# Patient Record
Sex: MALE | Race: WHITE | NOT HISPANIC OR LATINO | Employment: OTHER | URBAN - METROPOLITAN AREA
[De-identification: names, ages, dates, MRNs, and addresses within clinical notes are randomized per-mention and may not be internally consistent; named-entity substitution may affect disease eponyms.]

---

## 2021-09-13 ENCOUNTER — OFFICE VISIT (OUTPATIENT)
Dept: URGENT CARE | Facility: CLINIC | Age: 81
End: 2021-09-13
Payer: COMMERCIAL

## 2021-09-13 ENCOUNTER — HOSPITAL ENCOUNTER (INPATIENT)
Facility: HOSPITAL | Age: 81
LOS: 2 days | Discharge: HOME | End: 2021-09-15
Attending: FAMILY MEDICINE | Admitting: FAMILY MEDICINE
Payer: MEDICARE

## 2021-09-13 ENCOUNTER — HOSPITAL ENCOUNTER (OUTPATIENT)
Dept: RADIOLOGY | Facility: HOSPITAL | Age: 81
Discharge: HOME | End: 2021-09-13
Payer: COMMERCIAL

## 2021-09-13 VITALS
SYSTOLIC BLOOD PRESSURE: 148 MMHG | DIASTOLIC BLOOD PRESSURE: 71 MMHG | TEMPERATURE: 101.3 F | HEART RATE: 104 BPM | OXYGEN SATURATION: 91 %

## 2021-09-13 DIAGNOSIS — R05.9 COUGH: Primary | ICD-10-CM

## 2021-09-13 DIAGNOSIS — U07.1 PNEUMONIA DUE TO COVID-19 VIRUS: Primary | ICD-10-CM

## 2021-09-13 DIAGNOSIS — R05.9 COUGH: ICD-10-CM

## 2021-09-13 DIAGNOSIS — J12.82 PNEUMONIA DUE TO COVID-19 VIRUS: Primary | ICD-10-CM

## 2021-09-13 LAB
ALBUMIN SERPL-MCNC: 3.8 G/DL (ref 3.5–5.3)
ALP SERPL-CCNC: 68 U/L (ref 45–115)
ALT SERPL-CCNC: 30 U/L (ref 7–52)
ANION GAP SERPL CALC-SCNC: 11 MMOL/L (ref 3–11)
AST SERPL-CCNC: 27 U/L
BASOPHILS # BLD AUTO: 0 10*3/UL
BASOPHILS NFR BLD AUTO: 0 % (ref 0–2)
BILIRUB SERPL-MCNC: 1.84 MG/DL (ref 0.2–1.4)
BUN SERPL-MCNC: 19 MG/DL (ref 7–25)
CALCIUM ALBUM COR SERPL-MCNC: 9.1 MG/DL (ref 8.6–10.3)
CALCIUM SERPL-MCNC: 8.9 MG/DL (ref 8.6–10.3)
CHLORIDE SERPL-SCNC: 101 MMOL/L (ref 98–107)
CO2 SERPL-SCNC: 21 MMOL/L (ref 21–32)
CREAT SERPL-MCNC: 0.91 MG/DL (ref 0.7–1.3)
CRP SERPL-MCNC: 183.5 MG/L
EOSINOPHIL # BLD AUTO: 0 10*3/UL
EOSINOPHIL NFR BLD AUTO: 0 % (ref 0–3)
ERYTHROCYTE [DISTWIDTH] IN BLOOD BY AUTOMATED COUNT: 13.2 % (ref 11.5–15)
ERYTHROCYTE [SEDIMENTATION RATE] IN BLOOD: 69 MM/HR
FLUAV RNA NPH QL NAA+NON-PROBE: NEGATIVE
FLUBV RNA NPH QL NAA+NON-PROBE: NEGATIVE
GFR SERPL CREATININE-BSD FRML MDRD: 79 ML/MIN/1.73M*2
GLUCOSE SERPL-MCNC: 118 MG/DL (ref 70–105)
HCT VFR BLD AUTO: 39.3 % (ref 38–50)
HGB BLD-MCNC: 13.7 G/DL (ref 13.2–17.2)
LYMPHOCYTES # BLD AUTO: 0.6 10*3/UL
LYMPHOCYTES NFR BLD AUTO: 6 % (ref 15–47)
MCH RBC QN AUTO: 33.1 PG (ref 29–34)
MCHC RBC AUTO-ENTMCNC: 34.8 G/DL (ref 32–36)
MCV RBC AUTO: 95 FL (ref 82–97)
MONOCYTES # BLD AUTO: 0.6 10*3/UL
MONOCYTES NFR BLD AUTO: 5 % (ref 5–13)
NEUTROPHILS # BLD AUTO: 10.4 10*3/UL
NEUTROPHILS NFR BLD AUTO: 89 % (ref 46–70)
PLATELET # BLD AUTO: 194 10*3/UL (ref 130–350)
PMV BLD AUTO: 8.9 FL (ref 6.9–10.8)
POTASSIUM SERPL-SCNC: 3.3 MMOL/L (ref 3.5–5.1)
PROT SERPL-MCNC: 7 G/DL (ref 6–8.3)
RBC # BLD AUTO: 4.13 10*6/ΜL (ref 4.1–5.8)
RSV RNA NPH QL NAA+NON-PROBE: NEGATIVE
SARS-COV-2 RDRP RESP QL NAA+PROBE: POSITIVE
SARS-COV-2 RNA RESP QL NAA+PROBE: POSITIVE
SODIUM SERPL-SCNC: 133 MMOL/L (ref 135–145)
WBC # BLD AUTO: 11.7 10*3/UL (ref 3.7–9.6)

## 2021-09-13 PROCEDURE — 6370000100 HC RX 637 (ALT 250 FOR IP): Performed by: FAMILY MEDICINE

## 2021-09-13 PROCEDURE — 80053 COMPREHEN METABOLIC PANEL: CPT | Performed by: FAMILY MEDICINE

## 2021-09-13 PROCEDURE — 86140 C-REACTIVE PROTEIN: CPT | Performed by: FAMILY MEDICINE

## 2021-09-13 PROCEDURE — 6360000200 HC RX 636 W HCPCS (ALT 250 FOR IP): Performed by: FAMILY MEDICINE

## 2021-09-13 PROCEDURE — 85652 RBC SED RATE AUTOMATED: CPT | Performed by: FAMILY MEDICINE

## 2021-09-13 PROCEDURE — 87637 SARSCOV2&INF A&B&RSV AMP PRB: CPT | Performed by: FAMILY MEDICINE

## 2021-09-13 PROCEDURE — 71046 X-RAY EXAM CHEST 2 VIEWS: CPT

## 2021-09-13 PROCEDURE — 93005 ELECTROCARDIOGRAM TRACING: CPT | Performed by: FAMILY MEDICINE

## 2021-09-13 PROCEDURE — (BLANK) HC ROOM PRIVATE

## 2021-09-13 PROCEDURE — C9399 UNCLASSIFIED DRUGS OR BIOLOG: HCPCS | Performed by: FAMILY MEDICINE

## 2021-09-13 PROCEDURE — 93000 ELECTROCARDIOGRAM COMPLETE: CPT | Performed by: FAMILY MEDICINE

## 2021-09-13 PROCEDURE — 2580000300 HC RX 258: Performed by: FAMILY MEDICINE

## 2021-09-13 PROCEDURE — XW033E5 INTRODUCTION OF REMDESIVIR ANTI-INFECTIVE INTO PERIPHERAL VEIN, PERCUTANEOUS APPROACH, NEW TECHNOLOGY GROUP 5: ICD-10-PCS | Performed by: FAMILY MEDICINE

## 2021-09-13 PROCEDURE — 85025 COMPLETE CBC W/AUTO DIFF WBC: CPT | Performed by: FAMILY MEDICINE

## 2021-09-13 PROCEDURE — 99223 1ST HOSP IP/OBS HIGH 75: CPT | Mod: 25 | Performed by: FAMILY MEDICINE

## 2021-09-13 PROCEDURE — 36415 COLL VENOUS BLD VENIPUNCTURE: CPT | Performed by: FAMILY MEDICINE

## 2021-09-13 RX ORDER — ACETAMINOPHEN 500 MG
500 TABLET ORAL ONCE AS NEEDED
Status: ACTIVE | OUTPATIENT
Start: 2021-09-13 | End: 2021-09-14

## 2021-09-13 RX ORDER — ENOXAPARIN SODIUM 100 MG/ML
40 INJECTION SUBCUTANEOUS
Status: DISCONTINUED | OUTPATIENT
Start: 2021-09-13 | End: 2021-09-15 | Stop reason: HOSPADM

## 2021-09-13 RX ORDER — ASPIRIN 325 MG
325 TABLET, DELAYED RELEASE (ENTERIC COATED) ORAL DAILY
Status: DISCONTINUED | OUTPATIENT
Start: 2021-09-13 | End: 2021-09-15 | Stop reason: HOSPADM

## 2021-09-13 RX ORDER — ONDANSETRON 4 MG/1
4 TABLET, FILM COATED ORAL EVERY 6 HOURS PRN
Status: DISCONTINUED | OUTPATIENT
Start: 2021-09-13 | End: 2021-09-15 | Stop reason: HOSPADM

## 2021-09-13 RX ORDER — CODEINE PHOSPHATE AND GUAIFENESIN 10; 100 MG/5ML; MG/5ML
5 SOLUTION ORAL EVERY 4 HOURS PRN
Status: DISCONTINUED | OUTPATIENT
Start: 2021-09-13 | End: 2021-09-15 | Stop reason: HOSPADM

## 2021-09-13 RX ORDER — ONDANSETRON HYDROCHLORIDE 2 MG/ML
4 INJECTION, SOLUTION INTRAVENOUS EVERY 6 HOURS PRN
Status: DISCONTINUED | OUTPATIENT
Start: 2021-09-13 | End: 2021-09-15 | Stop reason: HOSPADM

## 2021-09-13 RX ORDER — ASPIRIN 325 MG
325 TABLET, DELAYED RELEASE (ENTERIC COATED) ORAL 2 TIMES DAILY
COMMUNITY
Start: 2021-04-15

## 2021-09-13 RX ORDER — ONDANSETRON 4 MG/1
4 TABLET, ORALLY DISINTEGRATING ORAL EVERY 6 HOURS PRN
Status: DISCONTINUED | OUTPATIENT
Start: 2021-09-13 | End: 2021-09-15 | Stop reason: HOSPADM

## 2021-09-13 RX ORDER — SODIUM CHLORIDE 9 MG/ML
75 INJECTION, SOLUTION INTRAVENOUS CONTINUOUS
Status: DISCONTINUED | OUTPATIENT
Start: 2021-09-13 | End: 2021-09-14

## 2021-09-13 RX ORDER — ZINC SULFATE 50(220)MG
220 CAPSULE ORAL DAILY
Status: DISCONTINUED | OUTPATIENT
Start: 2021-09-13 | End: 2021-09-15 | Stop reason: HOSPADM

## 2021-09-13 RX ORDER — ASCORBIC ACID 500 MG
500 TABLET ORAL DAILY
Status: DISCONTINUED | OUTPATIENT
Start: 2021-09-13 | End: 2021-09-15 | Stop reason: HOSPADM

## 2021-09-13 RX ORDER — ACETAMINOPHEN 650 MG/1
650 SUPPOSITORY RECTAL EVERY 4 HOURS PRN
Status: DISCONTINUED | OUTPATIENT
Start: 2021-09-13 | End: 2021-09-15 | Stop reason: HOSPADM

## 2021-09-13 RX ORDER — DIPHENHYDRAMINE HYDROCHLORIDE 50 MG/ML
25 INJECTION INTRAMUSCULAR; INTRAVENOUS ONCE AS NEEDED
Status: ACTIVE | OUTPATIENT
Start: 2021-09-13 | End: 2021-09-14

## 2021-09-13 RX ORDER — DEXAMETHASONE 6 MG/1
6 TABLET ORAL DAILY
Status: DISCONTINUED | OUTPATIENT
Start: 2021-09-13 | End: 2021-09-15 | Stop reason: HOSPADM

## 2021-09-13 RX ORDER — CHOLECALCIFEROL (VITAMIN D3) 25 MCG
2000 TABLET ORAL DAILY
Status: DISCONTINUED | OUTPATIENT
Start: 2021-09-13 | End: 2021-09-15 | Stop reason: HOSPADM

## 2021-09-13 RX ORDER — ACETAMINOPHEN 325 MG/1
650 TABLET ORAL EVERY 4 HOURS PRN
Status: DISCONTINUED | OUTPATIENT
Start: 2021-09-13 | End: 2021-09-15 | Stop reason: HOSPADM

## 2021-09-13 RX ORDER — ACETAMINOPHEN 650 MG/20.3ML
650 LIQUID ORAL EVERY 4 HOURS PRN
Status: DISCONTINUED | OUTPATIENT
Start: 2021-09-13 | End: 2021-09-15 | Stop reason: HOSPADM

## 2021-09-13 RX ADMIN — Medication 325 MG: at 16:13

## 2021-09-13 RX ADMIN — ENOXAPARIN SODIUM 40 MG: 100 INJECTION SUBCUTANEOUS at 16:14

## 2021-09-13 RX ADMIN — SODIUM CHLORIDE 75 ML/HR: 9 INJECTION, SOLUTION INTRAVENOUS at 15:42

## 2021-09-13 RX ADMIN — REMDESIVIR 200 MG: 100 INJECTION, POWDER, LYOPHILIZED, FOR SOLUTION INTRAVENOUS at 16:19

## 2021-09-13 RX ADMIN — GUAIFENESIN AND CODEINE PHOSPHATE 5 ML: 10; 100 LIQUID ORAL at 21:13

## 2021-09-13 RX ADMIN — DEXAMETHASONE 6 MG: 6 TABLET ORAL at 16:13

## 2021-09-13 RX ADMIN — GUAIFENESIN AND CODEINE PHOSPHATE 5 ML: 10; 100 LIQUID ORAL at 16:15

## 2021-09-13 RX ADMIN — OXYCODONE HYDROCHLORIDE AND ACETAMINOPHEN 500 MG: 500 TABLET ORAL at 16:14

## 2021-09-13 RX ADMIN — ZINC SULFATE 220 MG (50 MG) CAPSULE 220 MG: CAPSULE at 16:14

## 2021-09-13 RX ADMIN — Medication 2000 UNITS: at 16:13

## 2021-09-13 ASSESSMENT — ACTIVITIES OF DAILY LIVING (ADL)
PATIENT'S MEMORY ADEQUATE TO SAFELY COMPLETE DAILY ACTIVITIES?: YES
ADEQUATE_TO_COMPLETE_ADL: YES

## 2021-09-13 NOTE — PROGRESS NOTES
Duane P Kincaid has COVID-19 with plans to initiate casirivimab and imdevimab via EUA.     The patient is NOT primarily admitted to the hospital, requiring oxygen or requiring oxygen greater than baseline requirement due to an active COVID-19 infection.    The patient has mild-moderate COVID-19 with a positive COVID-CoV-2 test and is within 10 days of symptom onset with high risk for progression to severe COVID-19 and/or hospitalization. Date of symptom onset 9/3/2021.    The patient has the following high-risk criteria meeting requirements for the EUA: 65 years of age or older.    The patient and or patient's parent(s)/caregiver(s) has been given the 'Fact Sheet for Patient and Parents/Caregivers', informed of the alternatives to receiving casirivimab and imdevimab and informed that casirivimab and imdevimab is an unapproved drug that is authorized for use under EUA (Emergency Use Authorization).     Casirivimab and imdevimab fact sheet for healthcare providers  Casirivimab and imdevimab fact sheet for patients and parents and/or caregivers (english)    The patient does not have any known past adverse reaction(s) or hypersensitivity to casirivimab and imdevimab.    The patient  has agreed to treatment with casirivimab and imdevimab.    The patient will be monitored closely for adverse drug events. If concern for an adverse event occurs, pharmacy will be contacted and the event will be reported to the FDA.    Marcell Dietrich MD

## 2021-09-14 PROBLEM — M25.562 CHRONIC PAIN OF BOTH KNEES: Status: ACTIVE | Noted: 2019-01-17

## 2021-09-14 PROBLEM — M25.561 CHRONIC PAIN OF BOTH KNEES: Status: ACTIVE | Noted: 2019-01-17

## 2021-09-14 PROBLEM — N41.1 CHRONIC PROSTATITIS: Status: ACTIVE | Noted: 2018-02-26

## 2021-09-14 PROBLEM — C69.90: Status: ACTIVE | Noted: 2021-02-18

## 2021-09-14 PROBLEM — G89.29 CHRONIC PAIN OF BOTH KNEES: Status: ACTIVE | Noted: 2019-01-17

## 2021-09-14 PROBLEM — K11.9: Status: ACTIVE | Noted: 2017-01-04

## 2021-09-14 PROBLEM — L50.9 URTICARIA: Status: ACTIVE | Noted: 2019-01-17

## 2021-09-14 PROBLEM — N52.9 ED (ERECTILE DYSFUNCTION): Status: ACTIVE | Noted: 2021-02-18

## 2021-09-14 PROBLEM — R97.20 ELEVATED PROSTATE SPECIFIC ANTIGEN (PSA): Status: ACTIVE | Noted: 2018-02-26

## 2021-09-14 PROBLEM — R73.9 HYPERGLYCEMIA: Status: ACTIVE | Noted: 2017-01-04

## 2021-09-14 PROBLEM — M17.0 BILATERAL PRIMARY OSTEOARTHRITIS OF KNEE: Status: ACTIVE | Noted: 2021-04-13

## 2021-09-14 PROCEDURE — 6370000100 HC RX 637 (ALT 250 FOR IP): Performed by: FAMILY MEDICINE

## 2021-09-14 PROCEDURE — 94761 N-INVAS EAR/PLS OXIMETRY MLT: CPT

## 2021-09-14 PROCEDURE — 2580000300 HC RX 258: Performed by: FAMILY MEDICINE

## 2021-09-14 PROCEDURE — 99231 SBSQ HOSP IP/OBS SF/LOW 25: CPT | Performed by: FAMILY MEDICINE

## 2021-09-14 PROCEDURE — (BLANK) HC ROOM PRIVATE

## 2021-09-14 PROCEDURE — C9399 UNCLASSIFIED DRUGS OR BIOLOG: HCPCS | Performed by: FAMILY MEDICINE

## 2021-09-14 PROCEDURE — 6360000200 HC RX 636 W HCPCS (ALT 250 FOR IP): Performed by: FAMILY MEDICINE

## 2021-09-14 RX ORDER — ALBUTEROL SULFATE 90 UG/1
2 INHALANT RESPIRATORY (INHALATION) EVERY 4 HOURS PRN
Status: DISCONTINUED | OUTPATIENT
Start: 2021-09-14 | End: 2021-09-15 | Stop reason: HOSPADM

## 2021-09-14 RX ORDER — POTASSIUM CHLORIDE 750 MG/1
40 TABLET, FILM COATED, EXTENDED RELEASE ORAL ONCE
Status: COMPLETED | OUTPATIENT
Start: 2021-09-14 | End: 2021-09-14

## 2021-09-14 RX ADMIN — SODIUM CHLORIDE 75 ML/HR: 9 INJECTION, SOLUTION INTRAVENOUS at 06:47

## 2021-09-14 RX ADMIN — Medication 2000 UNITS: at 09:15

## 2021-09-14 RX ADMIN — GUAIFENESIN AND CODEINE PHOSPHATE 5 ML: 10; 100 LIQUID ORAL at 06:54

## 2021-09-14 RX ADMIN — ENOXAPARIN SODIUM 40 MG: 100 INJECTION SUBCUTANEOUS at 14:04

## 2021-09-14 RX ADMIN — ZINC SULFATE 220 MG (50 MG) CAPSULE 220 MG: CAPSULE at 09:15

## 2021-09-14 RX ADMIN — GUAIFENESIN AND CODEINE PHOSPHATE 5 ML: 10; 100 LIQUID ORAL at 20:06

## 2021-09-14 RX ADMIN — GUAIFENESIN AND CODEINE PHOSPHATE 5 ML: 10; 100 LIQUID ORAL at 01:20

## 2021-09-14 RX ADMIN — GUAIFENESIN AND CODEINE PHOSPHATE 5 ML: 10; 100 LIQUID ORAL at 13:46

## 2021-09-14 RX ADMIN — Medication 325 MG: at 09:15

## 2021-09-14 RX ADMIN — DEXAMETHASONE 6 MG: 6 TABLET ORAL at 09:15

## 2021-09-14 RX ADMIN — POTASSIUM CHLORIDE 40 MEQ: 750 TABLET, FILM COATED, EXTENDED RELEASE ORAL at 12:44

## 2021-09-14 RX ADMIN — REMDESIVIR 100 MG: 100 INJECTION, POWDER, LYOPHILIZED, FOR SOLUTION INTRAVENOUS at 16:00

## 2021-09-14 RX ADMIN — OXYCODONE HYDROCHLORIDE AND ACETAMINOPHEN 500 MG: 500 TABLET ORAL at 09:15

## 2021-09-14 ASSESSMENT — ENCOUNTER SYMPTOMS
POLYDIPSIA: 0
POLYDIPSIA: 0
HEMATURIA: 0
HEMATURIA: 0
SORE THROAT: 1
APPETITE CHANGE: 1
UNEXPECTED WEIGHT CHANGE: 0
ACTIVITY CHANGE: 1
FATIGUE: 1
FREQUENCY: 0
SINUS PRESSURE: 0
FATIGUE: 1
ADENOPATHY: 0
BACK PAIN: 0
SHORTNESS OF BREATH: 1
POLYPHAGIA: 0
NAUSEA: 1
WHEEZING: 1
SINUS PRESSURE: 0
RHINORRHEA: 1
ABDOMINAL PAIN: 0
ABDOMINAL PAIN: 0
VOMITING: 0
COUGH: 1
MYALGIAS: 1
WEAKNESS: 1
SORE THROAT: 1
BACK PAIN: 0
DECREASED CONCENTRATION: 0
DIZZINESS: 0
VOICE CHANGE: 0
POLYPHAGIA: 0
CHEST TIGHTNESS: 0
WOUND: 0
EYE PAIN: 0
WOUND: 0
DYSURIA: 0
FEVER: 1
FEVER: 1
FREQUENCY: 0
COUGH: 1
VOICE CHANGE: 0
DIARRHEA: 0
SLEEP DISTURBANCE: 1
APPETITE CHANGE: 0
ADENOPATHY: 0
SLEEP DISTURBANCE: 0
DIARRHEA: 0
DIZZINESS: 0
WEAKNESS: 1
ACTIVITY CHANGE: 0
RHINORRHEA: 1
SHORTNESS OF BREATH: 1
VOMITING: 0
DYSURIA: 0
DYSPHORIC MOOD: 0
UNEXPECTED WEIGHT CHANGE: 0
CHEST TIGHTNESS: 0

## 2021-09-14 NOTE — H&P
HOSPITAL HISTORY AND PHYSICAL    Date of Service: 09/14/21  Time of Service: 4:34 AM  Attending Provider: Marcell Dietrich MD  Primary Care Provider:Pcp No    Chief Complaint  COVID 19- pneumonia    HPI  Duane P Kincaid is a 81 y.o. male.  HPI  The patient is an 81-year-old male presenting for cough and shortness of breath.  The patient reports he became initially ill about 10 days ago with what appeared to be just an upper global congestion rhinorrhea.  Over the last 2 days it has progressed to a severe nonproductive cough, weakness, shortness of breath, congestion, rhinorrhea, postnasal drainage continue as well.  The patient is having difficulty completing activities due to his current constellation of symptoms.  He reports he is vaccinated for Covid in March 2021.  He denies any bloody sputum, he is chest pain only with cough currently.  He has no prior history of cardiac disease either.  His past medical history I uncomplicated.  He takes no chronic medications currently.  He has been attempting over-the-counter medications without any relief currently.    Past Medical History:  No past medical history on file.    No past surgical history on file.    Social History:  Social History      Most Recent Value   Living Arrangements Spouse/significant other   Support Systems --   Type of Residence Private residence         reports that he has never smoked. He has never used smokeless tobacco. He reports current alcohol use. He reports that he does not use drugs.    Family History:  family history is not on file.    Allergies:  Allergies   Allergen Reactions   • Sulfamethoxazole Nausea And Vomiting       Medications:   No current facility-administered medications on file prior to encounter.     Current Outpatient Medications on File Prior to Encounter   Medication Sig   • aspirin 325 mg EC tablet Take 325 mg by mouth 2 times daily         Review of Systems:  Review of Systems   Constitutional: Positive for activity change,  appetite change, fatigue and fever. Negative for unexpected weight change.   HENT: Positive for congestion, postnasal drip, rhinorrhea and sore throat. Negative for sinus pressure and voice change.    Eyes: Negative for pain and visual disturbance.   Respiratory: Positive for cough and shortness of breath. Negative for chest tightness.    Cardiovascular: Positive for chest pain (with coughing). Negative for leg swelling.   Gastrointestinal: Positive for nausea. Negative for abdominal pain, diarrhea and vomiting.   Endocrine: Negative for polydipsia and polyphagia.   Genitourinary: Negative for dysuria, frequency, hematuria and urgency.   Musculoskeletal: Positive for myalgias. Negative for back pain.   Skin: Negative for rash and wound.   Allergic/Immunologic: Negative for immunocompromised state.   Neurological: Positive for weakness. Negative for dizziness.   Hematological: Negative for adenopathy.   Psychiatric/Behavioral: Negative for decreased concentration, dysphoric mood and sleep disturbance.       Objective     Vital signs:  Temp:  [37.2 °C (99 °F)-38.5 °C (101.3 °F)] 37.2 °C (99 °F)  Heart Rate:  [] 93  Resp:  [17-18] 18  BP: (129-148)/(71-79) 129/76      Exam:  Physical Exam  Vitals reviewed.   Constitutional:       Appearance: He is ill-appearing.   HENT:      Head: Normocephalic and atraumatic.      Nose: Nose normal.      Mouth/Throat:      Mouth: Mucous membranes are moist.      Pharynx: No oropharyngeal exudate or posterior oropharyngeal erythema.   Eyes:      General:         Right eye: No discharge.         Left eye: No discharge.      Extraocular Movements: Extraocular movements intact.      Conjunctiva/sclera: Conjunctivae normal.      Pupils: Pupils are equal, round, and reactive to light.   Neck:      Vascular: No carotid bruit.      Comments: No JVD  Cardiovascular:      Rate and Rhythm: Normal rate and regular rhythm.      Pulses: Normal pulses.      Heart sounds: Normal heart sounds.  No murmur heard.   No friction rub. No gallop.    Pulmonary:      Effort: Tachypnea and accessory muscle usage present. No respiratory distress.      Breath sounds: Decreased air movement present. Wheezing present. No rales.      Comments: Diminished throughout    Abdominal:      General: Abdomen is flat. Bowel sounds are normal. There is no distension.      Palpations: Abdomen is soft. There is no mass.      Tenderness: There is no abdominal tenderness. There is no guarding.   Musculoskeletal:         General: No swelling or tenderness.      Cervical back: Neck supple. No rigidity or tenderness.   Lymphadenopathy:      Cervical: No cervical adenopathy.   Skin:     General: Skin is warm and dry.      Coloration: Skin is not jaundiced.   Neurological:      General: No focal deficit present.      Mental Status: He is alert and oriented to person, place, and time.      Sensory: No sensory deficit.      Gait: Gait normal.   Psychiatric:         Mood and Affect: Mood normal.         Labs:   Results for orders placed or performed in visit on 09/13/21   SARS/INFLUENZA/RSV QUADRUPLEX PCR    Specimen: Nasopharynx; Swab   Result Value Ref Range    Influenza A Screen by PCR Negative Negative    Influenza B Screen by PCR Negative Negative    COVID-19 PCR Positive (A) Negative    Respiratory Syncytial Virus Screen by PCR Negative Negative   C-reactive protein (Inflammation) Blood, Venous   Result Value Ref Range    .5 (H) <=10.0 mg/L   Sedimentation rate, automated Blood, Venous   Result Value Ref Range    Sed Rate 69 (H) <=20 mm/hr   CBC w/auto differential Blood, Venous   Result Value Ref Range    WBC 11.7 (H) 3.7 - 9.6 10*3/uL    RBC 4.13 4.10 - 5.80 10*6/µL    Hemoglobin 13.7 13.2 - 17.2 g/dL    Hematocrit 39.3 38.0 - 50.0 %    MCV 95.0 82.0 - 97.0 fL    MCH 33.1 29.0 - 34.0 pg    MCHC 34.8 32.0 - 36.0 g/dL    RDW 13.2 11.5 - 15.0 %    Platelets 194 130 - 350 10*3/uL    MPV 8.9 6.9 - 10.8 fL    Neutrophils% 89 (H) 46  - 70 %    Lymphocytes% 6 (L) 15 - 47 %    Monocytes% 5 5 - 13 %    Eosinophils% 0 0 - 3 %    Basophils% 0 0 - 2 %    ANC (auto diff) 10.40 10*3/UL    Lymphocytes Absolute 0.60 10*3/uL    Monocytes Absolute 0.60 10*3/uL    Eosinophils Absolute 0.00 10*3/uL    Basophils Absolute 0.00 10*3/uL   Comprehensive metabolic panel Blood, Venous   Result Value Ref Range    Sodium 133 (L) 135 - 145 mmol/L    Potassium 3.3 (L) 3.5 - 5.1 mmol/L    Chloride 101 98 - 107 mmol/L    CO2 21 21 - 32 mmol/L    Anion Gap 11 3 - 11 mmol/L    BUN 19 7 - 25 mg/dL    Creatinine 0.91 0.70 - 1.30 mg/dL    Glucose 118 (H) 70 - 105 mg/dL    Calcium 8.9 8.6 - 10.3 mg/dL    AST 27 <40 U/L    ALT (SGPT) 30 7 - 52 U/L    Alkaline Phosphatase 68 45 - 115 U/L    Total Protein 7.0 6.0 - 8.3 g/dL    Albumin 3.8 3.5 - 5.3 g/dL    Total Bilirubin 1.84 (H) 0.20 - 1.40 mg/dL    eGFR 79 >60 mL/min/1.73m*2    Corrected Calcium 9.1 8.6 - 10.3 mg/dL       Imaging:   X-ray chest 2 views    Result Date: 9/13/2021  Narrative: Exam: Chest x-ray, 2 views 09/13/2021 Clinical History: Cough Comparison(s): None available Findings: Bilateral vague patchy opacities are noted in the lungs, right side slightly more than left suspicious for groundglass infiltrate. Heart size and vascular markings are normal. No pneumothorax or effusion. No focal consolidation. Bony structures are unremarkable.     Impression: IMPRESSION: 1.  Faint bilateral patchy infiltrates suspicious for changes of atypical viral pneumonia (COVID-19).      EKG:  Normal sinus rhythm    Assessment:  Assessment/Plan    Active Problems:    Pneumonia due to COVID-19 virus      Plan:  1.  Covid pneumonia.  After risks and benefits were reviewed with the patient, he would like to initiate remdesivir.  Given his age the patient currently qualifies as well as his requirement for oxygen hospitalization.  Patient's labs are otherwise stable.  He is using 1 to 2 L of oxygen at rest, continue normal saline at 75 mils  per hour.  Patient started on Decadron 6 mg daily x10 days.  All questions reviewed with the patient.    Impressions and care plan discussed with patient. All questions answered. Please see orders.     DVT Prophylaxis: Enoxaparin 40 mg SC daily  CODESTATUS: Full Code    Time Spent: 45  minutes spent face-to-face with patient.  More than half was spent counseling the patient and/or coordinating care.    Electronically signed by: Marcell Dietrich MD  Date: 9/14/2021  Time: 4:34 AM    A voice recognition program was used to aid in medical record documentation. Sometimes words are printed not exactly as they were spoken. While efforts were made to carefully edit and correct any inaccuracies, some errors may be present. Errors should be taken within the context of the discussion.  Please contact our office if you need assistance interpreting this medical record or notice any mistakes.

## 2021-09-14 NOTE — INTERDISCIPLINARY/THERAPY
09/14/21 1400   O2 Discharge Determination   Activity Exercise;Resting   $ Multiple SpO2 Levels Obtained? - RT Charge Only Yes   DC Oxygen Determination Comment Patient does not require supplemental oxygen when resting or with activity.    O2 Discharge Determination Activity: Resting   Resting Lowest SPO2 (%) on Room Air 92 %   O2 Discharge Determination Activity: Exercise   Exercise Lowest SPO2 (%) on Room Air 89 %   Exercise Comment Patient did not drop below 89

## 2021-09-14 NOTE — PLAN OF CARE
Problem: Infection Control  Goal: MINIMIZE THE ACQUISITION AND TRANSMISSION OF INFECTIOUS AGENTS  Description: INTERVENTIONS:  1. Isolate patient with suspected/diagnosed communicable disease  2. Place on designated isolation precautions  3. Maintain isolation techniques  4. Perform hand hygiene before and after each patient care activity  5. [place] universal precautions  6. Wear PPE as directed for type of isolation  7. Administer antibiotic therapy as ordered  8. Clean the environment appropriately after each patient use  9. Clean patient care equipment after each patient use as it leaves the room  10. Limit number of visitors, as appropriate  Outcome: Progressing     Problem: Knowledge Deficit  Goal: Patient/family/caregiver demonstrates understanding of disease process, treatment plan, medications, and discharge instructions  Description: INTERVENTIONS:   1. Complete learning assessment and assess knowledge base  2. Provide teaching at level of understanding   3. Provide teaching via preferred learning methods  Outcome: Progressing     Problem: Potential for Compromised Skin Integrity  Goal: Skin Integrity is Maintained or Improved  Description: INTERVENTIONS:  1. Assess and monitor skin integrity  2. Collaborate with interdisciplinary team and initiate plans and interventions as needed  3. Alternate a full bath with partial baths for elderly   4. Monitor patient's hygiene practices   5. Collaborate with wound, ostomy, and continence nurse  Outcome: Progressing  Goal: Nutritional status is improving  Description: INTERVENTIONS:  1. Monitor and assess patient for malnutrition (ex- brittle hair, bruises, dry skin, pale skin and conjunctiva, muscle wasting, smooth red tongue, and disorientation)  2. Monitor patient's weight and dietary intake as ordered or per policy  3. Determine patient's food preferences and provide high-protein, high-caloric foods as appropriate  4. Assist patient with eating   5. Allow  adequate time for meals   6. Encourage patient to take dietary supplement as ordered   7. Collaborate with dietitian  8. Include patient/family/caregiver in decisions related to nutrition  Outcome: Progressing  Goal: MOBILITY IS MAINTAINED OR IMPROVED  Description: INTERVENTIONS  1. Collaborate with interdisciplinary team and initiate plan and interventions as ordered (PT/OT)  2. Encourage ambulation  3. Up to chair for meals  4. Monitor for signs of deconditioning  Outcome: Progressing     Problem: Urinary Incontinence  Goal: Perineal skin integrity is maintained or improved  Description: INTERVENTIONS:  1. Assess genitourinary system, perineal skin, labs (urinalysis), and history of incontinence to include past management, aggravating, and alleviating factors  2. Collaborate with interdisciplinary team including wound, ostomy, and continence nurse and initiate plans and interventions as needed  4. Consider urine containment device  5. Apply skin protectant   6. Develop skin care regimen  7. Provide privacy when changing patient's incontinence device to maintain their dignity  Outcome: Progressing

## 2021-09-14 NOTE — PROGRESS NOTES
Internal/Family Medicine Daily Progress Note   LOS: 1 day     Subjective      Patient states he is doing much better.  Cough is substantially improved with utilization of the codeine cough syrup.  Patient is on day 11 of Covid, with prior immunization.  He denies any chest pain, shortness of breath is improving, no nausea or vomiting.  Patient is inquiring about discharge, he is anxious to discharge.     Objective       Vital signs:  Temp:  [36.5 °C (97.7 °F)-38.5 °C (101.3 °F)] 36.5 °C (97.7 °F)  Heart Rate:  [] 73  Resp:  [16-18] 16  BP: (119-148)/(71-79) 119/73    Lab Results   Component Value Date    WBC 11.7 (H) 09/13/2021    HGB 13.7 09/13/2021    HCT 39.3 09/13/2021    MCV 95.0 09/13/2021     09/13/2021     Lab Results   Component Value Date    GLUCOSE 118 (H) 09/13/2021    CALCIUM 8.9 09/13/2021     (L) 09/13/2021    K 3.3 (L) 09/13/2021    CO2 21 09/13/2021     09/13/2021    BUN 19 09/13/2021    CREATININE 0.91 09/13/2021    ANIONGAP 11 09/13/2021       Physical Exam:    /73 (BP Location: Left arm, Patient Position: Head of bed 30 degrees or higher, Cuff Size: Regular Adult)   Pulse 73   Temp 36.5 °C (97.7 °F) (Oral)   Resp 16   Wt 99.8 kg (220 lb 0.3 oz)   SpO2 91%     Physical Exam  Vitals reviewed.   Constitutional:       Appearance: Normal appearance.   HENT:      Head: Normocephalic and atraumatic.      Nose: Nose normal.      Mouth/Throat:      Mouth: Mucous membranes are moist.   Eyes:      Extraocular Movements: Extraocular movements intact.      Conjunctiva/sclera: Conjunctivae normal.      Pupils: Pupils are equal, round, and reactive to light.   Cardiovascular:      Rate and Rhythm: Normal rate and regular rhythm.      Pulses: Normal pulses.      Heart sounds: No murmur heard.     Pulmonary:      Effort: Pulmonary effort is normal. No respiratory distress.      Breath sounds: No wheezing or rales.      Comments: Diminished throughout    Abdominal:      General:  Abdomen is flat. Bowel sounds are normal. There is no distension.      Palpations: Abdomen is soft.      Tenderness: There is no abdominal tenderness. There is no guarding.   Musculoskeletal:         General: No swelling.      Cervical back: Neck supple. No muscular tenderness.   Lymphadenopathy:      Cervical: No cervical adenopathy.   Skin:     General: Skin is warm and dry.   Neurological:      General: No focal deficit present.      Mental Status: He is alert.   Psychiatric:         Mood and Affect: Mood normal.           Assessment/Plan       Active Problems:    Pneumonia due to COVID-19 virus    1.  Covid pneumonia.  Continue current therapies, patient was weaned from oxygen during the daytime, still utilizing 1 L overnight.  Patient on day #2 of remdesivir as well as vitamin D, vitamin C, and zinc.  Patient clinically improving at this time, patient also codeine cough syrup as well.  Plan to discharge in the near future.      DVT Prophylaxis: lovenox

## 2021-09-15 ENCOUNTER — HOME MONITORING (OUTPATIENT)
Dept: FAMILY MEDICINE | Facility: CLINIC | Age: 81
End: 2021-09-15

## 2021-09-15 VITALS
RESPIRATION RATE: 18 BRPM | TEMPERATURE: 97.7 F | WEIGHT: 220.02 LBS | OXYGEN SATURATION: 91 % | HEART RATE: 75 BPM | DIASTOLIC BLOOD PRESSURE: 72 MMHG | SYSTOLIC BLOOD PRESSURE: 125 MMHG

## 2021-09-15 PROCEDURE — 99239 HOSP IP/OBS DSCHRG MGMT >30: CPT | Performed by: FAMILY MEDICINE

## 2021-09-15 PROCEDURE — 6370000100 HC RX 637 (ALT 250 FOR IP): Performed by: FAMILY MEDICINE

## 2021-09-15 RX ORDER — CODEINE PHOSPHATE AND GUAIFENESIN 10; 100 MG/5ML; MG/5ML
5 SOLUTION ORAL EVERY 4 HOURS PRN
Qty: 240 ML | Refills: 0
Start: 2021-09-15 | End: 2021-09-18

## 2021-09-15 RX ORDER — DEXAMETHASONE 6 MG/1
6 TABLET ORAL DAILY
Qty: 5 TABLET | Refills: 0
Start: 2021-09-15 | End: 2021-09-23

## 2021-09-15 RX ORDER — ALBUTEROL SULFATE 90 UG/1
2 INHALANT RESPIRATORY (INHALATION) EVERY 4 HOURS PRN
Qty: 18 G | Refills: 0
Start: 2021-09-15

## 2021-09-15 RX ADMIN — GUAIFENESIN AND CODEINE PHOSPHATE 5 ML: 10; 100 LIQUID ORAL at 02:46

## 2021-09-15 NOTE — PROGRESS NOTES
Duane P Kincaid  1940    Subjective     CC:  Chief Complaint   Patient presents with   • Cough   • Fever       HPI:  Duane P Kincaid is a 81 y.o. male who presents for cough, dyspnea, headache, congestion, rhinorrhea, post nasal discharge that isn't improving. Symptoms started 11 days ago and has been progressing. He notes now dyspnea, and severe non productive coughing. He has tried OTC medication with no relief. He has no chronic medications.     The following were reviewed and discussed and updated as appropriate:  Tobacco  Allergies  Meds  Problems  Med Hx  Surg Hx  Fam Hx         Problem List:  Patient Active Problem List   Diagnosis   • Pneumonia due to COVID-19 virus   • Bilateral primary osteoarthritis of knee   • Chronic pain of both knees   • Chronic prostatitis   • ED (erectile dysfunction)   • Elevated BP without diagnosis of hypertension   • Elevated LDL cholesterol level   • Elevated prostate specific antigen (PSA)   • Eye cancer (CMS/HCC) ([facility])   • Healthcare maintenance   • Hearing loss   • History of renal calculi   • Hyperglycemia   • Salivary gland disease   • Urticaria       Past Medical History:  History reviewed. No pertinent past medical history.    History reviewed. No pertinent surgical history.    Social History:  Social History     Tobacco Use   • Smoking status: Never Smoker   • Smokeless tobacco: Never Used   Vaping Use   • Vaping Use: Never used   Substance Use Topics   • Alcohol use: Yes   • Drug use: Never      Family History:  History reviewed. No pertinent family history.    Allergies:  Allergies   Allergen Reactions   • Sulfamethoxazole Nausea And Vomiting       Medications:   No current facility-administered medications on file prior to visit.     Current Outpatient Medications on File Prior to Visit   Medication Sig Dispense Refill   • aspirin 325 mg EC tablet Take 325 mg by mouth 2 times daily         Review of Systems:  Review of Systems   Constitutional: Positive  for fatigue and fever. Negative for activity change, appetite change and unexpected weight change.   HENT: Positive for congestion, postnasal drip, rhinorrhea and sore throat. Negative for sinus pressure and voice change.    Respiratory: Positive for cough, shortness of breath and wheezing. Negative for chest tightness.    Cardiovascular: Negative for chest pain and leg swelling.   Gastrointestinal: Negative for abdominal pain, diarrhea and vomiting.   Endocrine: Negative for polydipsia and polyphagia.   Genitourinary: Negative for dysuria, frequency, hematuria and urgency.   Musculoskeletal: Negative for back pain.   Skin: Negative for rash and wound.   Allergic/Immunologic: Negative for immunocompromised state.   Neurological: Positive for weakness. Negative for dizziness.   Hematological: Negative for adenopathy.   Psychiatric/Behavioral: Positive for sleep disturbance.       Objective   EXAM:  /71 (BP Location: Left arm, Patient Position: Sitting, Cuff Size: Regular Adult)   Pulse 104   Temp (!) 38.5 °C (101.3 °F) (Oral)   SpO2 91%   Physical Exam  Vitals reviewed.   Constitutional:       Appearance: Normal appearance.   HENT:      Head: Normocephalic and atraumatic.      Nose: Nose normal.      Mouth/Throat:      Mouth: Mucous membranes are moist.   Eyes:      Extraocular Movements: Extraocular movements intact.      Conjunctiva/sclera: Conjunctivae normal.      Pupils: Pupils are equal, round, and reactive to light.   Cardiovascular:      Rate and Rhythm: Normal rate and regular rhythm.      Pulses: Normal pulses.      Heart sounds: No murmur heard.     Pulmonary:      Effort: Tachypnea, accessory muscle usage, respiratory distress and retractions present.      Breath sounds: Decreased air movement present. Wheezing present. No rales.   Musculoskeletal:         General: No swelling.      Cervical back: Neck supple. No muscular tenderness.   Lymphadenopathy:      Cervical: No cervical adenopathy.    Skin:     General: Skin is warm and dry.   Neurological:      General: No focal deficit present.      Mental Status: He is alert.   Psychiatric:         Mood and Affect: Mood normal.       Procedures    X-ray chest 2 views  Narrative: Exam:   Chest x-ray, 2 views 09/13/2021    Clinical History:  Cough    Comparison(s):  None available    Findings:  Bilateral vague patchy opacities are noted in the lungs, right side slightly more than left suspicious for groundglass infiltrate. Heart size and vascular markings are normal. No pneumothorax or effusion. No focal consolidation. Bony structures are unremarkable.  Impression: IMPRESSION:  1.  Faint bilateral patchy infiltrates suspicious for changes of atypical viral pneumonia (COVID-19).      Assessment/Plan   A/P/Discussion:  No problem-specific Assessment & Plan notes found for this encounter.    ORDERS:  Diagnoses and all orders for this visit:    Cough  -     SARS/INFLUENZA/RSV QUADRUPLEX PCR  -     X-ray chest 2 views; Future  -     CBC w/auto differential Blood, Venous; Future  -     Comprehensive metabolic panel Blood, Venous; Future  -     C-reactive protein (Inflammation) Blood, Venous  -     Sedimentation rate, automated Blood, Venous     Patient with positive COVID test with hypoxia in clinic, labs consistent with diagnosis. Given level of hypoxia, patient admitted for further care.    Follow Up from this visit:  No follow-ups on file.  Patient Instructions:  There are no Patient Instructions on file for this visit.    Electronically signed by: Marcell Dietrich MD  9/14/2021  9:01 PM    A voice to text program was used to aid in medical record documentation. Sometimes words are printed not exactly as they were spoken. While efforts were made to carefully edit and correct any inaccuracies, some errors may be present. Errors should be taken within the context of the discussion.  Please contact our office if you need assistance interpreting this medical record or  notice any mistakes.

## 2021-09-15 NOTE — DISCHARGE SUMMARY
Inpatient Discharge Summary    BRIEF OVERVIEW  Admitting Provider: Marcell Dietrich MD  Discharge Provider: Marcell Dietrich MD  Primary Care Physician at Discharge: Pcp No None    Admission Date: 9/13/2021     Discharge Date: 9/15/2021    Primary Discharge Diagnosis  1. Pneumonia due to COVID-19 virus         Secondary Discharge Diagnosis  Active Problems:    Pneumonia due to COVID-19 virus        Discharge Disposition  Home  Code Status at Discharge: Full    Active Issues Requiring Follow-up  Cough    Outpatient Follow-Up  Primary care in 1 to 2 weeks    Test Results Pending at Discharge  None    DETAILS OF HOSPITAL STAY    Presenting Problem/History of Present Illness  Pneumonia due to COVID-19 virus [U07.1, J12.82]      Hospital Course  The patient presented to the clinic with cough and shortness of breath after having onset of symptoms 11 days prior to admission.  The patient was hypoxic to 86% for which she required 2 L of oxygen.  The patient was transferred to the hospital floor where he was given a course of remdesivir, he completed 2 of 5 days, Decadron, and oxygen support.  The patient was weaned from oxygen 24 hours prior to discharge.  The patient is feeling much better and cough was controlled with Cheratussin with codeine.  Patient will discharge home with albuterol, the finishing doses of Decadron, as well as Cheratussin with codeine.  He will follow-up with his primary care physician 1 to 2 weeks.  Patient also return to the ER or primary care office in the event that he has worsening symptoms including weakness, return of fever, shortness of breath or any new concerns.  Patient was afebrile 24 hours prior to discharge as well.    Medications at Discharge     Your medication list      START taking these medications      Instructions Last Dose Given Next Dose Due   albuterol HFA 90 mcg/actuation inhaler  Commonly known as: PROVENTIL HFA;VENTOLIN HFA      Inhale 2 puffs every 4 (four) hours as needed for  wheezing       codeine-guaifenesin  mg/5 mL liquid  Commonly known as: ROBITUSSIN-AC      Take 5 mL by mouth every 4 (four) hours as needed for cough for up to 3 days Max Daily Amount: 30 mL       dexAMETHasone 6 mg tablet  Commonly known as: DECADRON      Take 1 tablet (6 mg total) by mouth daily for 8 doses          CONTINUE taking these medications      Instructions Last Dose Given Next Dose Due   aspirin 325 mg EC tablet                 Where to Get Your Medications      Information about where to get these medications is not yet available    Ask your nurse or doctor about these medications  · albuterol HFA 90 mcg/actuation inhaler  · codeine-guaifenesin  mg/5 mL liquid  · dexAMETHasone 6 mg tablet           Physical Exam at Discharge  Discharge Condition: good  Heart Rate: 75  Resp: 18  BP: 125/72  Temp: 36.5 °C (97.7 °F)  Weight: 99.8 kg (220 lb 0.3 oz)    Physical Exam  Vitals reviewed.   Constitutional:       Appearance: Normal appearance.   HENT:      Head: Normocephalic and atraumatic.      Nose: Nose normal.      Mouth/Throat:      Mouth: Mucous membranes are moist.   Eyes:      Extraocular Movements: Extraocular movements intact.      Conjunctiva/sclera: Conjunctivae normal.      Pupils: Pupils are equal, round, and reactive to light.   Cardiovascular:      Rate and Rhythm: Normal rate and regular rhythm.      Pulses: Normal pulses.      Heart sounds: No murmur heard.     Pulmonary:      Effort: Pulmonary effort is normal. No respiratory distress.      Breath sounds: Normal breath sounds. No wheezing or rales.   Musculoskeletal:         General: No swelling.      Cervical back: Neck supple. No muscular tenderness.   Lymphadenopathy:      Cervical: No cervical adenopathy.   Skin:     General: Skin is warm and dry.   Neurological:      General: No focal deficit present.      Mental Status: He is alert.   Psychiatric:         Mood and Affect: Mood normal.       Over 30 minutes spent in the  discharge process for this patient.

## 2021-09-16 ENCOUNTER — HOME MONITORING (OUTPATIENT)
Dept: FAMILY MEDICINE | Facility: CLINIC | Age: 81
End: 2021-09-16

## 2022-09-19 ENCOUNTER — CONTACT LENSES/GLASSES VISIT (OUTPATIENT)
Dept: URBAN - NONMETROPOLITAN AREA CLINIC 1 | Facility: CLINIC | Age: 82
End: 2022-09-19

## 2022-09-19 DIAGNOSIS — H52.4: ICD-10-CM

## 2022-09-19 PROCEDURE — 92015 DETERMINE REFRACTIVE STATE: CPT

## 2022-09-19 ASSESSMENT — VISUAL ACUITY
OS_PH: 20/25-1
OD_CC: 20/20

## 2023-11-29 ENCOUNTER — FOLLOW UP (OUTPATIENT)
Dept: URBAN - NONMETROPOLITAN AREA CLINIC 1 | Facility: CLINIC | Age: 83
End: 2023-11-29

## 2023-11-29 DIAGNOSIS — H26.491: ICD-10-CM

## 2023-11-29 DIAGNOSIS — Z96.1: ICD-10-CM

## 2023-11-29 DIAGNOSIS — H35.371: ICD-10-CM

## 2023-11-29 PROCEDURE — 99214 OFFICE O/P EST MOD 30 MIN: CPT

## 2023-11-29 PROCEDURE — 92134 CPTRZ OPH DX IMG PST SGM RTA: CPT

## 2023-11-29 ASSESSMENT — VISUAL ACUITY
OU_CC: 20/25
OS_CC: 20/30-2
OD_CC: 20/25-1

## 2023-11-29 ASSESSMENT — TONOMETRY
OD_IOP_MMHG: 8
OS_IOP_MMHG: 8